# Patient Record
Sex: MALE | Race: WHITE | NOT HISPANIC OR LATINO | Employment: FULL TIME | URBAN - METROPOLITAN AREA
[De-identification: names, ages, dates, MRNs, and addresses within clinical notes are randomized per-mention and may not be internally consistent; named-entity substitution may affect disease eponyms.]

---

## 2019-03-31 ENCOUNTER — HOSPITAL ENCOUNTER (EMERGENCY)
Facility: HOSPITAL | Age: 24
Discharge: HOME | End: 2019-03-31
Attending: EMERGENCY MEDICINE

## 2019-03-31 VITALS
OXYGEN SATURATION: 99 % | DIASTOLIC BLOOD PRESSURE: 82 MMHG | HEART RATE: 92 BPM | SYSTOLIC BLOOD PRESSURE: 132 MMHG | BODY MASS INDEX: 31.06 KG/M2 | WEIGHT: 242 LBS | RESPIRATION RATE: 20 BRPM | TEMPERATURE: 100 F | HEIGHT: 74 IN

## 2019-03-31 DIAGNOSIS — J01.00 ACUTE NON-RECURRENT MAXILLARY SINUSITIS: ICD-10-CM

## 2019-03-31 DIAGNOSIS — J20.9 ACUTE BRONCHITIS, UNSPECIFIED ORGANISM: Primary | ICD-10-CM

## 2019-03-31 PROCEDURE — 99283 EMERGENCY DEPT VISIT LOW MDM: CPT

## 2019-03-31 RX ORDER — AMOXICILLIN 875 MG/1
875 TABLET, FILM COATED ORAL 2 TIMES DAILY
Qty: 14 TABLET | Refills: 0 | Status: SHIPPED | OUTPATIENT
Start: 2019-03-31 | End: 2020-02-05 | Stop reason: CLARIF

## 2019-03-31 RX ORDER — BENZONATATE 100 MG/1
100 CAPSULE ORAL 3 TIMES DAILY PRN
Qty: 20 CAPSULE | Refills: 0 | Status: SHIPPED | OUTPATIENT
Start: 2019-03-31 | End: 2019-04-10

## 2019-03-31 NOTE — ED PROVIDER NOTES
CHIEF COMPLAINT  Chief Complaint   Patient presents with    Cough    Nasal Congestion       HPI  Dejan Coy a 23 y.o. male who presents to the ED with complaints of cough and congestion. Generally does not feel well.   Has taken OTC meds with no improvement in symptoms.  Son has similar symptoms    CURRENT MEDICATIONS  No current facility-administered medications on file prior to encounter.      No current outpatient medications on file prior to encounter.       ALLERGIES  Review of patient's allergies indicates:  No Known Allergies      There is no immunization history on file for this patient.    PAST MEDICAL HISTORY  History reviewed. No pertinent past medical history.    SURGICAL HISTORY  History reviewed. No pertinent surgical history.    SOCIAL HISTORY  Social History     Socioeconomic History    Marital status: Single     Spouse name: Not on file    Number of children: Not on file    Years of education: Not on file    Highest education level: Not on file   Occupational History    Not on file   Social Needs    Financial resource strain: Not on file    Food insecurity:     Worry: Not on file     Inability: Not on file    Transportation needs:     Medical: Not on file     Non-medical: Not on file   Tobacco Use    Smoking status: Current Every Day Smoker   Substance and Sexual Activity    Alcohol use: Yes     Comment: occ    Drug use: Never    Sexual activity: Yes   Lifestyle    Physical activity:     Days per week: Not on file     Minutes per session: Not on file    Stress: Not on file   Relationships    Social connections:     Talks on phone: Not on file     Gets together: Not on file     Attends [employer] service: Not on file     Active member of club or organization: Not on file     Attends meetings of clubs or organizations: Not on file     Relationship status: Not on file    Intimate partner violence:     Fear of current or ex partner: Not on file     Emotionally abused: Not on file  "    Physically abused: Not on file     Forced sexual activity: Not on file   Other Topics Concern    Not on file   Social History Narrative    Not on file       FAMILY HISTORY  History reviewed. No pertinent family history.    REVIEW OF SYSTEMS  Constitutional: + fever,chills, no weakness.  Eyes: No redness, pain, or discharge  HENT: No ear pain, no headache, + rhinorrhea, no throat pain  Respiratory: + cough, wheezing or shortness of breath  Cardiovascular: No chest pain, palpitations or edema  GI: No abdominal pain, nausea, vomiting or diarrhea  Gu: No dysuria, no hematuria, or discharge  Musculoskeletal: No pain, full range of motion. Good sensation  Skin: No rash or abrasion  Neurologic: No focal weakness or sensory changes.  All systems otherwise negative except as noted in the Review of Systems and History of Present Illness      PHYSICAL EXAM  Reviewed Triage Note  VITAL SIGNS:   Patient Vitals for the past 24 hrs:   BP Temp Temp src Pulse Resp SpO2 Height Weight   03/31/19 1744 132/82 99.9 °F (37.7 °C) Oral 92 20 99 % 6' 2" (1.88 m) 109.8 kg (242 lb)     Constitutional: Well developed, well nourished, Alert and oriented x3, No acute distress, non-toxic appearance.  HENT: Normocephalic, Atraumatic, TM's full/dull bilaterally. Pharynx red with purulent PND. + frontal and maxillary tenderness to palpation  Eyes: PERRL, EOMI, Conjunctiva normal, No discharge.  Neck: Normal range of motion, no tenderness, supple  Respiratory: Few scattered rhonchi, no rales, few wheezes  Cardiovascular: Normal heart rate, normal rhythm, no murmurs, no rubs, no gallops.  Gi: Bowel sounds normal, soft, no tenderness, non-distended, no masses, no pulsatile masses.  Musculoskeletal: No edema, no tenderness, no cyanosis, no clubbing. Good range of motion in all major joints. No tenderness to palpation or major deformities noted.   Integument: Warm, Dry, No erythema, no rash  Neurologic: Normal motor function, normal sensory " function. No focal deficits noted. Intact distal pulses  Psychiatric: Affect normal, judgment normal, mood normal      LABS  Pertinent labs reviewed. (see chart for details)  Labs Reviewed - No data to display    RADIOLOGY  No orders to display         PROCEDURE  Procedures      ED COURSE & MEDICAL DECISION MAKING     MDM       Physical exam findings discussed with patient. No acute emergent medical condition identified at this time to warrant further testing. Will dispo home with instructions to follow up with PCP, return to the ED for worsening condition. Pt agrees with plan of care.     DISPOSITION  Patient discharged in stable condition 3/31/2019  6:06 PM      CLINICAL IMPRESSION:  The primary encounter diagnosis was Acute bronchitis, unspecified organism. A diagnosis of Acute non-recurrent maxillary sinusitis was also pertinent to this visit.    Patient advised to follow-up with your PCP within 3 days for BP re-check if Blood Pressure was >120/80 without history of hypertension.         Solange Nunez, MOLLY  03/31/19 4351

## 2020-02-05 ENCOUNTER — HOSPITAL ENCOUNTER (EMERGENCY)
Facility: HOSPITAL | Age: 25
Discharge: HOME | End: 2020-02-05

## 2020-02-05 VITALS
SYSTOLIC BLOOD PRESSURE: 146 MMHG | BODY MASS INDEX: 32.08 KG/M2 | TEMPERATURE: 98 F | HEIGHT: 74 IN | HEART RATE: 100 BPM | RESPIRATION RATE: 20 BRPM | WEIGHT: 250 LBS | OXYGEN SATURATION: 96 % | DIASTOLIC BLOOD PRESSURE: 94 MMHG

## 2020-02-05 DIAGNOSIS — J10.1 INFLUENZA A: Primary | ICD-10-CM

## 2020-02-05 LAB
DEPRECATED S PYO AG THROAT QL EIA: NEGATIVE
INFLUENZA A, MOLECULAR: POSITIVE
INFLUENZA B, MOLECULAR: NEGATIVE
SPECIMEN SOURCE: ABNORMAL

## 2020-02-05 PROCEDURE — 87880 STREP A ASSAY W/OPTIC: CPT

## 2020-02-05 PROCEDURE — 87502 INFLUENZA DNA AMP PROBE: CPT

## 2020-02-05 PROCEDURE — 99284 EMERGENCY DEPT VISIT MOD MDM: CPT

## 2020-02-05 PROCEDURE — 87081 CULTURE SCREEN ONLY: CPT

## 2020-02-05 RX ORDER — FLUTICASONE PROPIONATE 50 MCG
2 SPRAY, SUSPENSION (ML) NASAL DAILY PRN
Qty: 9.9 ML | Refills: 0 | Status: SHIPPED | OUTPATIENT
Start: 2020-02-05 | End: 2020-02-15

## 2020-02-05 RX ORDER — OSELTAMIVIR PHOSPHATE 75 MG/1
75 CAPSULE ORAL 2 TIMES DAILY
Qty: 10 CAPSULE | Refills: 0 | Status: SHIPPED | OUTPATIENT
Start: 2020-02-05 | End: 2020-02-10

## 2020-02-05 NOTE — DISCHARGE INSTRUCTIONS
Take medication as prescribed    Tylenol and/or Motrin for fever and body aches    Keep well hydrated    Return to emergency room if symptoms worsen

## 2020-02-05 NOTE — ED PROVIDER NOTES
Encounter Date: 2/5/2020       History     Chief Complaint   Patient presents with    Cough     Patient complaining of cough, congestion and body aches x3 days.    Nasal Congestion    Generalized Body Aches     Dejan Herrera is a 24 y.o male with no sign PMHx. He presentst to ED with complaint of fever, cough, congestion, body aches and chills x 3 days    No wheezing or respiratory distress    He reports high fever of 103    No abdominal pain. No N/V/D. He is tolerating fluids well    He is current may taking Motrin and Mucinex for symptoms    He denies any close sick contacts            Review of patient's allergies indicates:  No Known Allergies  History reviewed. No pertinent past medical history.  History reviewed. No pertinent surgical history.  History reviewed. No pertinent family history.  Social History     Tobacco Use    Smoking status: Current Every Day Smoker   Substance Use Topics    Alcohol use: Yes     Comment: occ    Drug use: Never     Review of Systems   Constitutional: Positive for chills and fever. Negative for activity change, appetite change, diaphoresis, fatigue and unexpected weight change.   HENT: Positive for congestion, postnasal drip, rhinorrhea, sinus pressure, sinus pain and sore throat. Negative for dental problem, drooling, ear discharge, ear pain, facial swelling, hearing loss, mouth sores, nosebleeds and sneezing.    Eyes: Negative.    Respiratory: Positive for cough. Negative for chest tightness, shortness of breath and wheezing.    Cardiovascular: Negative.  Negative for chest pain.   Gastrointestinal: Negative.  Negative for nausea.   Endocrine: Negative.    Genitourinary: Negative.  Negative for dysuria.   Musculoskeletal: Positive for myalgias. Negative for back pain.   Skin: Negative.  Negative for rash.   Allergic/Immunologic: Negative.    Neurological: Negative.  Negative for weakness.   Hematological: Negative.  Does not bruise/bleed easily.   Psychiatric/Behavioral:  Negative.    All other systems reviewed and are negative.      Physical Exam     Initial Vitals [02/05/20 1118]   BP Pulse Resp Temp SpO2   (!) 146/94 100 20 97.9 °F (36.6 °C) 96 %      MAP       --         Physical Exam    Nursing note and vitals reviewed.  Constitutional: Vital signs are normal. He appears well-developed and well-nourished. He is not diaphoretic. No distress.   HENT:   Head: Normocephalic.   Right Ear: Hearing, tympanic membrane, external ear and ear canal normal.   Left Ear: Hearing, tympanic membrane, external ear and ear canal normal.   Nose: Nose normal.   Mouth/Throat: Uvula is midline, oropharynx is clear and moist and mucous membranes are normal.   Eyes: Conjunctivae are normal.   Neck: Normal range of motion. Neck supple.   Cardiovascular: Normal rate.   Pulmonary/Chest: Breath sounds normal.   Musculoskeletal: Normal range of motion.   Neurological: He is alert and oriented to person, place, and time. GCS score is 15. GCS eye subscore is 4. GCS verbal subscore is 5. GCS motor subscore is 6.   Skin: Skin is warm. Capillary refill takes less than 2 seconds.   Psychiatric: He has a normal mood and affect. His behavior is normal. Judgment and thought content normal.         ED Course   Procedures  Labs Reviewed   INFLUENZA A & B BY MOLECULAR - Abnormal; Notable for the following components:       Result Value    Influenza A, Molecular Positive (*)     All other components within normal limits   THROAT SCREEN, RAPID   CULTURE, STREP A,  THROAT          Imaging Results    None          Medical Decision Making:   Initial Assessment:   Patient with complaint of fever, cough, congestion, body aches and chills x 3 days    No wheezing or respiratory distress    He reports high fever of 103    No abdominal pain. No N/V/D. He is tolerating fluids well    He is current may taking Motrin and Mucinex for symptoms    He denies any close sick contacts        Differential Diagnosis:   Influenza, strep,  sinusitis, pneumonia, bronchitis, URI  ED Management:  Strep negative    Influenza positive    Discussed physical exam findings with patient  No acute emergent medical condition identified at this time to warrant further testing/diagnostics  At this time, I believe the patient is clinically stable for discharge.   Patient to follow up with PCP in 1-2 days.  The patient acknowledges that close follow up with a MD is required after all ER visits  Pt given instructions; take all medications prescribed in the ER as directed.   Patient agrees to comply with all instruction and direction given in the ER  Pt agrees to return to ER if any symptoms reoccur                                          Clinical Impression:       ICD-10-CM ICD-9-CM   1. Influenza A J10.1 487.1                             Anabel Dorantes NP  02/05/20 1215

## 2020-02-05 NOTE — ED NOTES
Pt d/c and medication instructions reviewed and he verbalized understanding. Pt ambulatory at d/c and escorted to registration.

## 2020-02-07 LAB — BACTERIA THROAT CULT: NORMAL

## 2020-05-19 ENCOUNTER — HOSPITAL ENCOUNTER (EMERGENCY)
Facility: HOSPITAL | Age: 25
Discharge: HOME | End: 2020-05-19
Attending: EMERGENCY MEDICINE

## 2020-05-19 VITALS
WEIGHT: 256 LBS | HEART RATE: 84 BPM | RESPIRATION RATE: 20 BRPM | OXYGEN SATURATION: 97 % | DIASTOLIC BLOOD PRESSURE: 94 MMHG | BODY MASS INDEX: 32.85 KG/M2 | HEIGHT: 74 IN | TEMPERATURE: 98 F | SYSTOLIC BLOOD PRESSURE: 144 MMHG

## 2020-05-19 DIAGNOSIS — S05.02XA ABRASION OF LEFT CORNEA, INITIAL ENCOUNTER: Primary | ICD-10-CM

## 2020-05-19 PROCEDURE — 99283 EMERGENCY DEPT VISIT LOW MDM: CPT

## 2020-05-19 PROCEDURE — 25000003 PHARM REV CODE 250: Performed by: EMERGENCY MEDICINE

## 2020-05-19 RX ORDER — TETRACAINE HYDROCHLORIDE 5 MG/ML
2 SOLUTION OPHTHALMIC ONCE
Status: COMPLETED | OUTPATIENT
Start: 2020-05-19 | End: 2020-05-19

## 2020-05-19 RX ADMIN — FLUORESCEIN SODIUM 1 EACH: 1 STRIP OPHTHALMIC at 11:05

## 2020-05-19 RX ADMIN — TETRACAINE HYDROCHLORIDE 2 DROP: 5 SOLUTION OPHTHALMIC at 11:05

## 2020-05-19 NOTE — ED PROVIDER NOTES
Encounter Date: 5/19/2020       History     Chief Complaint   Patient presents with    Eye Problem     Patient has burning and redness to left eye.     24-year-old male with no significant past medical history presents to the ED for evaluation of left eye irritation with increased tearing and redness.  States he was in [employer] yesterday, felt as though something was stuck in his eye, irrigated aggressively with some improvement, went to bed with no issue, but awoke with the same irritation.  Denies decreased vision or eye pain.  Denies fever, chills.        Review of patient's allergies indicates:  No Known Allergies  History reviewed. No pertinent past medical history.  History reviewed. No pertinent surgical history.  History reviewed. No pertinent family history.  Social History     Tobacco Use    Smoking status: Current Every Day Smoker   Substance Use Topics    Alcohol use: Yes     Comment: occ    Drug use: Never     Review of Systems   Constitutional: Negative for appetite change, chills, diaphoresis, fatigue and fever.   HENT: Negative for congestion, ear pain, rhinorrhea, sinus pressure, sinus pain, sore throat and tinnitus.    Eyes: Positive for discharge, redness and itching. Negative for photophobia, pain and visual disturbance.   Respiratory: Negative for cough, chest tightness, shortness of breath and wheezing.    Cardiovascular: Negative for chest pain, palpitations and leg swelling.   Gastrointestinal: Negative for abdominal pain, constipation, diarrhea, nausea and vomiting.   Endocrine: Negative for cold intolerance, heat intolerance, polydipsia, polyphagia and polyuria.   Genitourinary: Negative for decreased urine volume, difficulty urinating, dysuria, flank pain, frequency, hematuria and urgency.   Musculoskeletal: Negative for arthralgias, back pain, gait problem, joint swelling, myalgias, neck pain and neck stiffness.   Skin: Negative for color change, pallor, rash and wound.    Allergic/Immunologic: Negative for immunocompromised state.   Neurological: Negative for dizziness, syncope, weakness, light-headedness, numbness and headaches.   Hematological: Negative for adenopathy. Does not bruise/bleed easily.   Psychiatric/Behavioral: Negative for decreased concentration, dysphoric mood and sleep disturbance. The patient is not nervous/anxious.    All other systems reviewed and are negative.      Physical Exam     Initial Vitals [05/19/20 1054]   BP Pulse Resp Temp SpO2   (!) 144/94 84 20 98.3 °F (36.8 °C) 97 %      MAP       --         Physical Exam    Nursing note and vitals reviewed.  Constitutional: He appears well-developed and well-nourished. He is not diaphoretic. No distress.   HENT:   Head: Normocephalic and atraumatic.   Right Ear: External ear normal.   Left Ear: External ear normal.   Nose: Nose normal.   Mouth/Throat: Oropharynx is clear and moist.   Eyes: EOM and lids are normal. Pupils are equal, round, and reactive to light. Lids are everted and swept, no foreign bodies found. Right eye exhibits no discharge and no exudate. No foreign body present in the right eye. Left eye exhibits discharge. Left eye exhibits no exudate. No foreign body present in the left eye. Right conjunctiva is not injected. Right conjunctiva has no hemorrhage. Left conjunctiva is injected. Left conjunctiva has no hemorrhage. No scleral icterus.   Neck: Normal range of motion. Neck supple.   Cardiovascular: Normal rate, regular rhythm, normal heart sounds and intact distal pulses.   Pulmonary/Chest: Breath sounds normal. No respiratory distress. He has no wheezes. He has no rhonchi. He exhibits no tenderness.   Abdominal: Soft. Bowel sounds are normal. He exhibits no distension. There is no tenderness. There is no rebound and no guarding.   Musculoskeletal: Normal range of motion. He exhibits no edema or tenderness.   Lymphadenopathy:     He has no cervical adenopathy.   Neurological: He is alert and  oriented to person, place, and time. GCS score is 15. GCS eye subscore is 4. GCS verbal subscore is 5. GCS motor subscore is 6.   Skin: Skin is warm and dry. Capillary refill takes less than 2 seconds. No rash and no abscess noted. No erythema. No pallor.   Psychiatric: He has a normal mood and affect. His behavior is normal. Judgment and thought content normal.         ED Course   Procedures  Labs Reviewed - No data to display       Imaging Results    None          Medical Decision Making:   Differential Diagnosis:   Retained foreign object, conjunctivitis, corneal abrasion  ED Management:  Tetracaine applied and fluorescein strip utilized with Woods lamp - no visualization of corneal ulceration                                 Clinical Impression:       ICD-10-CM ICD-9-CM   1. Abrasion of left cornea, initial encounter S05.02XA 918.1         Disposition:   Disposition: Discharged  Condition: Stable     ED Disposition Condition    Discharge Stable        ED Prescriptions     Medication Sig Dispense Start Date End Date Auth. Provider    gentamicin 15 mg Drop Place 1 drop into the left eye 2 (two) times daily. for 5 days 15 mL 5/19/2020 5/24/2020 Susanna Lozoya MD        Follow-up Information    None                                    Susanna Lozoya MD  05/19/20 6162

## 2020-06-19 ENCOUNTER — HOSPITAL ENCOUNTER (EMERGENCY)
Facility: HOSPITAL | Age: 25
Discharge: HOME | End: 2020-06-19
Attending: FAMILY MEDICINE
Payer: OTHER GOVERNMENT

## 2020-06-19 VITALS
RESPIRATION RATE: 20 BRPM | HEIGHT: 74 IN | DIASTOLIC BLOOD PRESSURE: 84 MMHG | TEMPERATURE: 99 F | HEART RATE: 91 BPM | SYSTOLIC BLOOD PRESSURE: 130 MMHG | BODY MASS INDEX: 33.11 KG/M2 | WEIGHT: 258 LBS | OXYGEN SATURATION: 96 %

## 2020-06-19 DIAGNOSIS — B34.9 VIRAL SYNDROME: ICD-10-CM

## 2020-06-19 DIAGNOSIS — R42 DIZZINESS: Primary | ICD-10-CM

## 2020-06-19 LAB — SARS-COV-2 RDRP RESP QL NAA+PROBE: NEGATIVE

## 2020-06-19 PROCEDURE — 25000003 PHARM REV CODE 250: Performed by: FAMILY MEDICINE

## 2020-06-19 PROCEDURE — U0002 COVID-19 LAB TEST NON-CDC: HCPCS

## 2020-06-19 PROCEDURE — 99283 EMERGENCY DEPT VISIT LOW MDM: CPT

## 2020-06-19 RX ORDER — ACETAMINOPHEN 500 MG
1000 TABLET ORAL
Status: COMPLETED | OUTPATIENT
Start: 2020-06-19 | End: 2020-06-19

## 2020-06-19 RX ADMIN — ACETAMINOPHEN 1000 MG: 500 TABLET, FILM COATED ORAL at 10:06

## 2020-06-19 NOTE — Clinical Note
Dejan Herrera was seen and treated in our emergency department on 6/19/2020.  He may return to work on 06/23/2020.       If you have any questions or concerns, please don't hesitate to call.      Mikal Guy MD

## 2020-06-20 NOTE — ED PROVIDER NOTES
Encounter Date: 6/19/2020       History     Chief Complaint   Patient presents with    Dizziness     Patient complaining of lightheadedness, vertigo, headache and feeling like he wants to pass out.    Headache     24-year-old male presents complaining of sensation of feeling lightheaded fatigue with mild headache in the nuchal area he denies any cough nausea vomiting shortness of breath I was eating at a restaurant and does felt ill he denies diarrhea no history of fever or known exposure to COVID-19        Review of patient's allergies indicates:  No Known Allergies  History reviewed. No pertinent past medical history.  History reviewed. No pertinent surgical history.  History reviewed. No pertinent family history.  Social History     Tobacco Use    Smoking status: Current Every Day Smoker   Substance Use Topics    Alcohol use: Yes     Comment: occ    Drug use: Never     Review of Systems   Constitutional: Positive for fatigue. Negative for fever.   HENT: Negative for sore throat.    Respiratory: Negative for shortness of breath.    Cardiovascular: Negative for chest pain.   Gastrointestinal: Negative for nausea.   Genitourinary: Negative for dysuria.   Musculoskeletal: Negative for back pain.   Skin: Negative for rash.   Neurological: Positive for dizziness, weakness and headaches.   Hematological: Does not bruise/bleed easily.       Physical Exam     Initial Vitals [06/19/20 2112]   BP Pulse Resp Temp SpO2   (!) 141/88 99 20 98.6 °F (37 °C) 97 %      MAP       --         Physical Exam    Nursing note and vitals reviewed.  Constitutional: He appears well-developed and well-nourished. He is not diaphoretic. No distress.   HENT:   Head: Normocephalic and atraumatic.   Right Ear: External ear normal.   Left Ear: External ear normal.   Nose: Nose normal.   Mouth/Throat: Oropharynx is clear and moist. No oropharyngeal exudate.   Eyes: EOM are normal.   Neck: Normal range of motion. Neck supple. No tracheal  deviation present.   Cardiovascular: Normal rate and regular rhythm.   No murmur heard.  Pulmonary/Chest: Breath sounds normal. No stridor. No respiratory distress. He has no rales.   Abdominal: Soft. He exhibits no distension and no mass. There is no abdominal tenderness. There is no rebound.   Musculoskeletal: Normal range of motion. No edema.   Lymphadenopathy:     He has no cervical adenopathy.   Neurological: He is alert and oriented to person, place, and time. He has normal strength.   Skin: Skin is warm and dry. Capillary refill takes less than 2 seconds. No pallor.   Psychiatric: He has a normal mood and affect.         ED Course   Procedures  Labs Reviewed   SARS-COV-2 RNA AMPLIFICATION, QUAL          Imaging Results    None                                          Clinical Impression:       ICD-10-CM ICD-9-CM   1. Dizziness  R42 780.4   2. Viral syndrome  B34.9 079.99             ED Disposition Condition    Discharge Stable        ED Prescriptions     None        Follow-up Information    None                                    Mikal Guy MD  06/20/20 0255

## 2020-06-24 ENCOUNTER — LAB VISIT (OUTPATIENT)
Dept: LAB | Facility: HOSPITAL | Age: 25
End: 2020-06-24
Attending: FAMILY MEDICINE
Payer: OTHER GOVERNMENT

## 2020-06-24 ENCOUNTER — HOSPITAL ENCOUNTER (OUTPATIENT)
Dept: RADIOLOGY | Facility: HOSPITAL | Age: 25
Discharge: HOME | End: 2020-06-24
Attending: FAMILY MEDICINE

## 2020-06-24 ENCOUNTER — OFFICE VISIT (OUTPATIENT)
Dept: FAMILY MEDICINE | Facility: CLINIC | Age: 25
End: 2020-06-24

## 2020-06-24 VITALS
TEMPERATURE: 97 F | BODY MASS INDEX: 31.97 KG/M2 | HEIGHT: 74 IN | DIASTOLIC BLOOD PRESSURE: 91 MMHG | SYSTOLIC BLOOD PRESSURE: 139 MMHG | WEIGHT: 249.13 LBS | HEART RATE: 86 BPM | RESPIRATION RATE: 17 BRPM | OXYGEN SATURATION: 97 %

## 2020-06-24 DIAGNOSIS — K59.00 CONSTIPATION, UNSPECIFIED CONSTIPATION TYPE: ICD-10-CM

## 2020-06-24 DIAGNOSIS — R42 DIZZINESS: ICD-10-CM

## 2020-06-24 DIAGNOSIS — R42 DIZZINESS: Primary | ICD-10-CM

## 2020-06-24 LAB
ALBUMIN SERPL BCP-MCNC: 4.8 G/DL (ref 3.5–5.2)
ALP SERPL-CCNC: 67 U/L (ref 55–135)
ALT SERPL W/O P-5'-P-CCNC: 67 U/L (ref 10–44)
ANION GAP SERPL CALC-SCNC: 11 MMOL/L (ref 8–16)
AST SERPL-CCNC: 31 U/L (ref 10–40)
BASOPHILS # BLD AUTO: 0.07 K/UL (ref 0–0.2)
BASOPHILS NFR BLD: 0.4 % (ref 0–1.9)
BILIRUB SERPL-MCNC: 0.9 MG/DL (ref 0.1–1)
BUN SERPL-MCNC: 14 MG/DL (ref 6–20)
CALCIUM SERPL-MCNC: 9.5 MG/DL (ref 8.7–10.5)
CHLORIDE SERPL-SCNC: 101 MMOL/L (ref 95–110)
CO2 SERPL-SCNC: 23 MMOL/L (ref 23–29)
CREAT SERPL-MCNC: 1.1 MG/DL (ref 0.5–1.4)
DIFFERENTIAL METHOD: ABNORMAL
EOSINOPHIL # BLD AUTO: 0.2 K/UL (ref 0–0.5)
EOSINOPHIL NFR BLD: 1.1 % (ref 0–8)
ERYTHROCYTE [DISTWIDTH] IN BLOOD BY AUTOMATED COUNT: 11.9 % (ref 11.5–14.5)
EST. GFR  (AFRICAN AMERICAN): >60 ML/MIN/1.73 M^2
EST. GFR  (NON AFRICAN AMERICAN): >60 ML/MIN/1.73 M^2
GLUCOSE SERPL-MCNC: 88 MG/DL (ref 70–110)
HCT VFR BLD AUTO: 49 % (ref 40–54)
HGB BLD-MCNC: 17.3 G/DL (ref 14–18)
IMM GRANULOCYTES # BLD AUTO: 0.06 K/UL (ref 0–0.04)
IMM GRANULOCYTES NFR BLD AUTO: 0.4 % (ref 0–0.5)
LYMPHOCYTES # BLD AUTO: 3.5 K/UL (ref 1–4.8)
LYMPHOCYTES NFR BLD: 22 % (ref 18–48)
MCH RBC QN AUTO: 30.7 PG (ref 27–31)
MCHC RBC AUTO-ENTMCNC: 35.3 G/DL (ref 32–36)
MCV RBC AUTO: 87 FL (ref 82–98)
MONOCYTES # BLD AUTO: 0.8 K/UL (ref 0.3–1)
MONOCYTES NFR BLD: 4.9 % (ref 4–15)
NEUTROPHILS # BLD AUTO: 11.4 K/UL (ref 1.8–7.7)
NEUTROPHILS NFR BLD: 71.2 % (ref 38–73)
NRBC BLD-RTO: 0 /100 WBC
PLATELET # BLD AUTO: 325 K/UL (ref 150–350)
PMV BLD AUTO: 10.7 FL (ref 9.2–12.9)
POTASSIUM SERPL-SCNC: 3.8 MMOL/L (ref 3.5–5.1)
PROT SERPL-MCNC: 8.2 G/DL (ref 6–8.4)
RBC # BLD AUTO: 5.63 M/UL (ref 4.6–6.2)
SODIUM SERPL-SCNC: 135 MMOL/L (ref 136–145)
TSH SERPL DL<=0.005 MIU/L-ACNC: 2.55 UIU/ML (ref 0.34–5.6)
WBC # BLD AUTO: 16.02 K/UL (ref 3.9–12.7)

## 2020-06-24 PROCEDURE — 36415 COLL VENOUS BLD VENIPUNCTURE: CPT

## 2020-06-24 PROCEDURE — 99203 PR OFFICE/OUTPT VISIT, NEW, LEVL III, 30-44 MIN: ICD-10-PCS | Mod: S$GLB,,, | Performed by: FAMILY MEDICINE

## 2020-06-24 PROCEDURE — 99203 OFFICE O/P NEW LOW 30 MIN: CPT | Mod: S$GLB,,, | Performed by: FAMILY MEDICINE

## 2020-06-24 PROCEDURE — 74019 XR ABDOMEN FLAT AND ERECT: ICD-10-PCS | Mod: 26,,, | Performed by: RADIOLOGY

## 2020-06-24 PROCEDURE — 84443 ASSAY THYROID STIM HORMONE: CPT

## 2020-06-24 PROCEDURE — 85025 COMPLETE CBC W/AUTO DIFF WBC: CPT

## 2020-06-24 PROCEDURE — 74019 RADEX ABDOMEN 2 VIEWS: CPT | Mod: 26,,, | Performed by: RADIOLOGY

## 2020-06-24 PROCEDURE — 80053 COMPREHEN METABOLIC PANEL: CPT

## 2020-06-24 PROCEDURE — 74019 RADEX ABDOMEN 2 VIEWS: CPT | Mod: TC,FY

## 2020-06-24 RX ORDER — ACETAMINOPHEN 500 MG
500 TABLET ORAL EVERY 6 HOURS PRN
COMMUNITY
End: 2020-07-22

## 2020-06-25 ENCOUNTER — TELEPHONE (OUTPATIENT)
Dept: FAMILY MEDICINE | Facility: CLINIC | Age: 25
End: 2020-06-25

## 2020-06-25 NOTE — TELEPHONE ENCOUNTER
----- Message from Kathy Rock sent at 6/25/2020  9:52 AM CDT -----  Regarding: lab results  Contact: self  Type:  Test Results    Who Called: self  Name of Test (Lab/Mammo/Etc):  lab and x-ray  Date of Test:  06/24/2020  Ordering Provider:  Dr Campos   Where the test was performed:    Best Call Back Number:  [phone]  Additional Information:

## 2020-06-25 NOTE — TELEPHONE ENCOUNTER
Spoke to patient about his lab results. Informed that WBC elevated but no left shift. No signs or symptoms of infection seen at appt. Will repeat cbc in 3 weeks.   Thyroid and CMP normal.   KUB normal.  Patient reports after he took mag citrate he felt better.   Will follow up next week at appt.

## 2020-07-01 ENCOUNTER — OFFICE VISIT (OUTPATIENT)
Dept: FAMILY MEDICINE | Facility: CLINIC | Age: 25
End: 2020-07-01

## 2020-07-01 VITALS
SYSTOLIC BLOOD PRESSURE: 139 MMHG | WEIGHT: 244.63 LBS | TEMPERATURE: 98 F | HEART RATE: 95 BPM | OXYGEN SATURATION: 96 % | BODY MASS INDEX: 31.39 KG/M2 | RESPIRATION RATE: 16 BRPM | HEIGHT: 74 IN | DIASTOLIC BLOOD PRESSURE: 92 MMHG

## 2020-07-01 DIAGNOSIS — D72.829 LEUKOCYTOSIS, UNSPECIFIED TYPE: ICD-10-CM

## 2020-07-01 DIAGNOSIS — R14.0 ABDOMINAL BLOATING: Primary | ICD-10-CM

## 2020-07-01 PROCEDURE — 99214 OFFICE O/P EST MOD 30 MIN: CPT | Mod: S$GLB,,, | Performed by: FAMILY MEDICINE

## 2020-07-01 PROCEDURE — 99214 PR OFFICE/OUTPT VISIT, EST, LEVL IV, 30-39 MIN: ICD-10-PCS | Mod: S$GLB,,, | Performed by: FAMILY MEDICINE

## 2020-07-01 NOTE — PATIENT INSTRUCTIONS
Eating a High-Fiber Diet  Fiber is what gives strength and structure to plants. Most grains, beans, vegetables, and fruits contain fiber. Foods rich in fiber are often low in calories and fat, and they fill you up more. They may also reduce your risks for certain health problems. To find out the amount of fiber in canned, packaged, or frozen foods, read the Nutrition Facts label. It tells you how much fiber is in a serving.    Types of fiber and their benefits  There are two types of fiber: insoluble and soluble. They both aid digestion and help you maintain a healthy weight.  · Insoluble fiber. This is found in whole grains, cereals, certain fruits and vegetables such as apple skin, corn, and carrots. Insoluble fiber may prevent constipation and reduce the risk for certain types of cancer.  · Soluble fiber. This type of fiber is in oats, beans, and certain fruits and vegetables such as strawberries and peas. Soluble fiber can reduce cholesterol, which may help lower the risk for heart disease. It also helps control blood sugar levels.  Look for high-fiber foods  Try these foods to add fiber to your diet:  · Whole-grain breads and cereals. Try to eat 6 to 8 ounces a day. Include wheat and oat bran cereals, whole-wheat muffins or toast, and corn tortillas in your meals.  · Fruits. Try to eat 2 cups a day. Apples, oranges, strawberries, pears, and bananas are good sources. (Note: Fruit juice is low in fiber.)  · Vegetables. Try to eat at least 2.5 cups a day. Add asparagus, carrots, broccoli, peas, and corn to your meals.  · Beans. One cup of cooked lentils gives you over 15 grams of fiber. Try navy beans, lentils, and chickpeas.  · Seeds. A small handful of seeds gives you about 3 grams of fiber. Try sunflower seeds.  Keep track of your fiber  Keep track of how much fiber you eat. Start by reading food labels. Then eat a variety of foods high in fiber. As you begin to eat more fiber, ask your healthcare provider  how much water you should be drinking to keep your digestive system working smoothly.  You should aim for a certain amount of fiber in your diet each day. If you are a woman, that amount is between 25 and 28 grams per day. Men should aim for 30 to 33 grams per day. After age 50, your daily fiber needs drop to 22 grams for women and 28 grams for men.  Before you reach for the fiber supplements, think about this. Fiber is found naturally in healthy whole foods. It gives you that feeling of fullness after you eat. Taking fiber supplements or eating fiber-enriched foods will not give you this full feeling.  Your fiber intake is a good measure for the quality of your overall diet. If you are missing out on your daily amount of fiber, you may be lacking other important nutrients as well.  Date Last Reviewed: 5/11/2015 © 2000-2017 [employer]. [address] [place] [address]. All rights reserved. This information is not intended as a substitute for professional medical care. Always follow your healthcare professional's instructions.

## 2020-07-15 ENCOUNTER — LAB VISIT (OUTPATIENT)
Dept: LAB | Facility: HOSPITAL | Age: 25
End: 2020-07-15
Attending: FAMILY MEDICINE
Payer: OTHER GOVERNMENT

## 2020-07-15 DIAGNOSIS — D72.829 LEUKOCYTOSIS, UNSPECIFIED TYPE: ICD-10-CM

## 2020-07-15 LAB
BASOPHILS # BLD AUTO: 0.03 K/UL (ref 0–0.2)
BASOPHILS NFR BLD: 0.3 % (ref 0–1.9)
DIFFERENTIAL METHOD: ABNORMAL
EOSINOPHIL # BLD AUTO: 0.3 K/UL (ref 0–0.5)
EOSINOPHIL NFR BLD: 2.2 % (ref 0–8)
ERYTHROCYTE [DISTWIDTH] IN BLOOD BY AUTOMATED COUNT: 12 % (ref 11.5–14.5)
HCT VFR BLD AUTO: 46.7 % (ref 40–54)
HGB BLD-MCNC: 16.2 G/DL (ref 14–18)
IMM GRANULOCYTES # BLD AUTO: 0.06 K/UL (ref 0–0.04)
IMM GRANULOCYTES NFR BLD AUTO: 0.5 % (ref 0–0.5)
LYMPHOCYTES # BLD AUTO: 2.8 K/UL (ref 1–4.8)
LYMPHOCYTES NFR BLD: 24.2 % (ref 18–48)
MCH RBC QN AUTO: 30.8 PG (ref 27–31)
MCHC RBC AUTO-ENTMCNC: 34.7 G/DL (ref 32–36)
MCV RBC AUTO: 89 FL (ref 82–98)
MONOCYTES # BLD AUTO: 0.5 K/UL (ref 0.3–1)
MONOCYTES NFR BLD: 4.7 % (ref 4–15)
NEUTROPHILS # BLD AUTO: 7.8 K/UL (ref 1.8–7.7)
NEUTROPHILS NFR BLD: 68.1 % (ref 38–73)
NRBC BLD-RTO: 0 /100 WBC
PLATELET # BLD AUTO: 261 K/UL (ref 150–350)
PMV BLD AUTO: 11.2 FL (ref 9.2–12.9)
RBC # BLD AUTO: 5.26 M/UL (ref 4.6–6.2)
WBC # BLD AUTO: 11.38 K/UL (ref 3.9–12.7)

## 2020-07-15 PROCEDURE — 36415 COLL VENOUS BLD VENIPUNCTURE: CPT

## 2020-07-15 PROCEDURE — 85025 COMPLETE CBC W/AUTO DIFF WBC: CPT

## 2020-07-16 ENCOUNTER — PATIENT OUTREACH (OUTPATIENT)
Dept: ADMINISTRATIVE | Facility: HOSPITAL | Age: 25
End: 2020-07-16

## 2020-07-22 ENCOUNTER — OFFICE VISIT (OUTPATIENT)
Dept: FAMILY MEDICINE | Facility: CLINIC | Age: 25
End: 2020-07-22

## 2020-07-22 ENCOUNTER — HOSPITAL ENCOUNTER (OUTPATIENT)
Dept: CARDIOLOGY | Facility: HOSPITAL | Age: 25
Discharge: HOME | End: 2020-07-22
Attending: FAMILY MEDICINE
Payer: OTHER GOVERNMENT

## 2020-07-22 VITALS
OXYGEN SATURATION: 98 % | SYSTOLIC BLOOD PRESSURE: 126 MMHG | DIASTOLIC BLOOD PRESSURE: 88 MMHG | HEART RATE: 90 BPM | RESPIRATION RATE: 17 BRPM | TEMPERATURE: 99 F | BODY MASS INDEX: 30.08 KG/M2 | WEIGHT: 234.38 LBS | HEIGHT: 74 IN

## 2020-07-22 DIAGNOSIS — R07.9 CHEST PAIN, UNSPECIFIED TYPE: Primary | ICD-10-CM

## 2020-07-22 DIAGNOSIS — R07.9 CHEST PAIN, UNSPECIFIED TYPE: ICD-10-CM

## 2020-07-22 DIAGNOSIS — R53.83 FATIGUE, UNSPECIFIED TYPE: ICD-10-CM

## 2020-07-22 PROCEDURE — 93005 ELECTROCARDIOGRAM TRACING: CPT

## 2020-07-22 PROCEDURE — 99214 PR OFFICE/OUTPT VISIT, EST, LEVL IV, 30-39 MIN: ICD-10-PCS | Mod: S$GLB,,, | Performed by: FAMILY MEDICINE

## 2020-07-22 PROCEDURE — 99214 OFFICE O/P EST MOD 30 MIN: CPT | Mod: S$GLB,,, | Performed by: FAMILY MEDICINE

## 2020-07-22 NOTE — PROGRESS NOTES
"[facility] [place] [facility]    Subjective      Mr. Herrera is a 24 y.o. male who presents to clinic for a follow up.     Reports that his stomach pain has resolved.   States that he does not feel like himself.   Feels like something is wrong.   Admits to fatigue and intermittent substernal chest pain.   Repeat CBC normal and resolution of leukocytosis.    PMH Dejan has a past medical history of No pertinent past medical history (06/24/2020).   PSXH Dejan has a past surgical history that includes no surgical history  (06/24/2020).    Dejan's family history includes COPD in his mother; Crohn's disease in his mother; Emphysema in his mother; Epilepsy in his mother; No Known Problems in his father.   SH Dejan reports that he has been smoking. He has a 10.50 pack-year smoking history. He has never used smokeless tobacco. He reports current alcohol use. He reports that he does not use drugs.   ALG Dejan has No Known Allergies.   MED Dejan currently has no medications in their medication list.     Review of Systems   Constitutional: Positive for fatigue. Negative for activity change, appetite change, chills and fever.   Eyes: Negative for visual disturbance.   Respiratory: Negative for cough and shortness of breath.    Cardiovascular: Positive for chest pain. Negative for palpitations and leg swelling.   Gastrointestinal: Negative for abdominal pain, nausea and vomiting.   Skin: Negative for wound.   Neurological: Negative for dizziness, syncope and headaches.   Psychiatric/Behavioral: Negative for confusion.     Objective     /88   Pulse 90   Temp 99.1 °F (37.3 °C) (Temporal)   Resp 17   Ht 6' 2" (1.88 m)   Wt 106.3 kg (234 lb 6 oz)   SpO2 98%   BMI 30.09 kg/m²     Physical Exam   Constitutional:  Non-toxic appearance. He does not appear ill. No distress.   HENT:   Head: Normocephalic and atraumatic.   Eyes: Right eye exhibits no discharge. Left eye exhibits no discharge.   Cardiovascular: " Normal rate, regular rhythm, normal heart sounds and normal pulses. Exam reveals no gallop and no friction rub.   No murmur heard.  Pulmonary/Chest: Effort normal and breath sounds normal. No respiratory distress. He has no wheezes. He has no rhonchi. He has no rales. He exhibits no tenderness.   Abdominal: Normal appearance.   Musculoskeletal:      Right lower leg: No edema.      Left lower leg: No edema.   Lymphadenopathy:     He has no cervical adenopathy.   Neurological: He is alert.   Skin: Skin is warm and dry. Capillary refill takes less than 2 seconds. He is not diaphoretic.   Psychiatric: His behavior is normal. Mood, judgment and thought content normal.   Vitals reviewed.     Assessment/Plan     Dejan was seen today for fatigue.    Diagnoses and all orders for this visit:    Chest pain, unspecified type  -     SCHEDULED EKG 12-LEAD (to Muse); Future    Fatigue, unspecified type  -     Vitamin D; Future  -     Vitamin B12; Future      Ella Campos MD  Family Medicine  [facility]

## 2020-08-09 NOTE — PROGRESS NOTES
"[facility] - Clinic Note    Subjective      Mr. Herrera is a 24 y.o. male who presents to clinic for a follow up.     Patient was seen last week for episodes of dizziness.   Labs were obtained which revealed that ALT mildly elevated and WBC at 16.02, and TSH normal.  Reports that dizziness has improved.   Complains of abdominal bloating.   Present for the past week. Was seen last week for constipation. Took mag citrate which helped. States that he has bloating and feels full.   Denies diarrhea or abdominal pain. No nausea or vomiting.    PMLIZZIE Wylie has a past medical history of No pertinent past medical history (06/24/2020).   PSXH Dejan has a past surgical history that includes no surgical history  (06/24/2020).   PAWEL Wylie's family history includes COPD in his mother; Crohn's disease in his mother; Emphysema in his mother; Epilepsy in his mother; No Known Problems in his father.   RADHA Wylie reports that he has been smoking. He has a 10.50 pack-year smoking history. He has never used smokeless tobacco. He reports current alcohol use. He reports that he does not use drugs.   AISSATOU Wylie has No Known Allergies.   CHIKI Wylie currently has no medications in their medication list.     Review of Systems   Constitutional: Negative for activity change, appetite change, chills, fatigue and fever.   Eyes: Negative for visual disturbance.   Respiratory: Negative for cough and shortness of breath.    Cardiovascular: Negative for chest pain, palpitations and leg swelling.   Gastrointestinal: Negative for abdominal pain, nausea and vomiting.   Skin: Negative for wound.   Neurological: Negative for dizziness, syncope and headaches.   Psychiatric/Behavioral: Negative for confusion.     Objective     BP (!) 139/92 (BP Location: Right arm, Patient Position: Sitting, BP Method: Large (Automatic))   Pulse 95   Temp 98 °F (36.7 °C) (Temporal)   Resp 16   Ht 6' 2" (1.88 m)   Wt 110.9 kg (244 lb 9.6 oz)   SpO2 96%   BMI " 31.40 kg/m²     Physical Exam   Constitutional:  Non-toxic appearance. He does not appear ill. No distress.   HENT:   Head: Normocephalic and atraumatic.   Eyes: Right eye exhibits no discharge. Left eye exhibits no discharge.   Cardiovascular: Normal rate, regular rhythm, normal heart sounds and normal pulses. Exam reveals no gallop and no friction rub.   No murmur heard.  Pulmonary/Chest: Effort normal and breath sounds normal. No respiratory distress. He has no wheezes. He has no rhonchi. He has no rales.   Abdominal: Normal appearance.   Musculoskeletal:      Right lower leg: No edema.      Left lower leg: No edema.   Lymphadenopathy:     He has no cervical adenopathy.   Neurological: He is alert.   Skin: Skin is warm and dry. Capillary refill takes less than 2 seconds. He is not diaphoretic.   Psychiatric: His behavior is normal. Mood, judgment and thought content normal.   Vitals reviewed.     Assessment/Plan     Dejan was seen today for follow-up.    Diagnoses and all orders for this visit:    Abdominal bloating  -OTC gas-x and low fat, high fiber diet.    Leukocytosis, unspecified type  -     CBC auto differential; Future  - Recheck in 3-4 weeks.        Ella Campos MD  Family Medicine  [facility]

## 2020-08-26 NOTE — PROGRESS NOTES
"[facility] - Clinic Note    Subjective      Mr. Herrera is a 24 y.o. male who presents to clinic with complaints of dizziness.     Patient reports that he went to eat a [place] a couple days ago and felt dizzy, middle chest pain, and abdominal fullness.   Denies the sensation that the room is spinning.   Stomach feels full. No able to make a BM.  States that he went to [facility] the day after his symptom onset. Labs were obtained. States that he was told his white count was high and his ear looked red.     PMLIZZIE Wylie has a past medical history of No pertinent past medical history (06/24/2020).   PSXH Dejan has a past surgical history that includes no surgical history  (06/24/2020).   PAWEL Wylie's family history includes COPD in his mother; Crohn's disease in his mother; Emphysema in his mother; Epilepsy in his mother; No Known Problems in his father.   RADHA Wylie reports that he has been smoking. He has a 10.50 pack-year smoking history. He has never used smokeless tobacco. He reports current alcohol use. He reports that he does not use drugs.   AISSATOU Wylie has No Known Allergies.   CHIKI Wylie currently has no medications in their medication list.     Review of Systems   Constitutional: Negative for activity change, appetite change, chills, fatigue and fever.   Eyes: Negative for visual disturbance.   Respiratory: Negative for cough and shortness of breath.    Cardiovascular: Negative for chest pain, palpitations and leg swelling.   Gastrointestinal: Positive for constipation. Negative for abdominal pain, nausea and vomiting.   Skin: Negative for wound.   Neurological: Positive for dizziness. Negative for syncope and headaches.   Psychiatric/Behavioral: Negative for confusion.     Objective     BP (!) 139/91 (BP Location: Right arm)   Pulse 86   Temp 97 °F (36.1 °C) (Temporal)   Resp 17   Ht 6' 2" (1.88 m)   Wt 113 kg (249 lb 2 oz)   SpO2 97%   BMI 31.99 kg/m²     Physical Exam   Constitutional:  " Non-toxic appearance. He does not appear ill. No distress.   HENT:   Head: Normocephalic and atraumatic.   Right Ear: Tympanic membrane, external ear and ear canal normal.   Left Ear: Tympanic membrane, external ear and ear canal normal.   Nose: Nose normal.   Mouth/Throat: Mucous membranes are moist. Oropharynx is clear.   Eyes: Pupils are equal, round, and reactive to light. Conjunctivae are normal. Right eye exhibits no discharge. Left eye exhibits no discharge. No scleral icterus.   Cardiovascular: Normal rate, regular rhythm, normal heart sounds and normal pulses. Exam reveals no gallop and no friction rub.   No murmur heard.  Pulmonary/Chest: Effort normal and breath sounds normal. No respiratory distress. He has no wheezes. He has no rhonchi. He has no rales.   Abdominal: Normal appearance.   Musculoskeletal:      Right lower leg: No edema.      Left lower leg: No edema.   Lymphadenopathy:     He has no cervical adenopathy.   Neurological: He is alert.   Skin: Skin is warm and dry. Capillary refill takes less than 2 seconds. He is not diaphoretic.   Psychiatric: His behavior is normal. Mood, judgment and thought content normal.   Vitals reviewed.     Assessment/Plan     Dejan was seen today for dizziness.    Diagnoses and all orders for this visit:  -New patient and new problem to me    Dizziness  -     Comprehensive metabolic panel; Future  -     CBC auto differential; Future  -     TSH; Future    Constipation, unspecified constipation type  -     X-Ray Abdomen Flat And Erect; Future    Ella Campos MD  Family Medicine  [facility]

## 2020-12-10 ENCOUNTER — HOSPITAL ENCOUNTER (EMERGENCY)
Facility: HOSPITAL | Age: 25
Discharge: HOME | End: 2020-12-10
Attending: EMERGENCY MEDICINE
Payer: OTHER GOVERNMENT

## 2020-12-10 VITALS
SYSTOLIC BLOOD PRESSURE: 149 MMHG | OXYGEN SATURATION: 98 % | RESPIRATION RATE: 20 BRPM | HEART RATE: 98 BPM | BODY MASS INDEX: 29 KG/M2 | DIASTOLIC BLOOD PRESSURE: 92 MMHG | TEMPERATURE: 98 F | WEIGHT: 226 LBS | HEIGHT: 74 IN

## 2020-12-10 DIAGNOSIS — U07.1 COVID-19 VIRUS INFECTION: Primary | ICD-10-CM

## 2020-12-10 DIAGNOSIS — U07.1 COVID-19 VIRUS DETECTED: ICD-10-CM

## 2020-12-10 LAB — SARS-COV-2 RDRP RESP QL NAA+PROBE: POSITIVE

## 2020-12-10 PROCEDURE — 99282 EMERGENCY DEPT VISIT SF MDM: CPT

## 2020-12-10 PROCEDURE — U0002 COVID-19 LAB TEST NON-CDC: HCPCS

## 2020-12-10 NOTE — DISCHARGE INSTRUCTIONS
Download the [employer] jann to access your health records & test results.  Please remember that you had a visit to the emergency room today and this does not substitute as primary care services for ongoing management because emergency services is a snap shot in time.  Should you have any worsening condition that requires emergency services do not hesitate to return to the ER.

## 2020-12-10 NOTE — ED PROVIDER NOTES
Encounter Date: 12/10/2020       History     Chief Complaint   Patient presents with    COVID-19 Concerns     Wants COVID test, cough.    Cough     25-year-old male presents to ER for concerns of mild nonproductive cough that started this morning upon awakening and is requesting COVID-19 test; denies exacerbating or relieving factors, no prescription OTC medications have been initiated symptoms are described as mild currently    Denies:  fever, headache, dizziness, syncope, vision changes, neck pain, painful/difficult swallowing, chest pain, shortness breath, abdominal pain, nausea/vomiting/diarrhea, hematuria/dysuria    No previous evaluation has been performed nor has PCP been contacted for today's concerns    Past medical/surgical history, allergies & current medications reviewed with patient    Known SARS-CoV2 exposure:  No  Room:  Car    The history is provided by the patient. No  was used.     Review of patient's allergies indicates:  No Known Allergies  Past Medical History:   Diagnosis Date    No pertinent past medical history 06/24/2020     Past Surgical History:   Procedure Laterality Date    no surgical history   06/24/2020     Family History   Problem Relation Age of Onset    Emphysema Mother     Crohn's disease Mother     COPD Mother     Epilepsy Mother     No Known Problems Father      Social History     Tobacco Use    Smoking status: Current Every Day Smoker     Packs/day: 1.50     Years: 7.00     Pack years: 10.50    Smokeless tobacco: Never Used   Substance Use Topics    Alcohol use: Yes     Comment: occassionaly     Drug use: Never     Review of Systems   Constitutional: Negative for fever.   HENT: Negative for sore throat.    Respiratory: Positive for cough. Negative for shortness of breath.    Cardiovascular: Negative for chest pain.   Gastrointestinal: Negative for abdominal pain and nausea.   Genitourinary: Negative for dysuria.   Musculoskeletal: Negative for  back pain.   Skin: Negative for rash.   Neurological: Negative for weakness and headaches.   Hematological: Does not bruise/bleed easily.   All other systems reviewed and are negative.      Physical Exam     Initial Vitals [12/10/20 1401]   BP Pulse Resp Temp SpO2   (!) 149/92 98 20 98.1 °F (36.7 °C) 98 %      MAP       --         Physical Exam    Nursing note and vitals reviewed.  Constitutional: He appears well-developed. He does not appear ill. No distress.   AF, VSS   HENT:   Head: Normocephalic and atraumatic.   Right Ear: External ear normal.   Left Ear: External ear normal.   Nose: Nose normal.   Eyes: Lids are normal.   Neck: Neck supple.   Cardiovascular: Normal rate.   Pulmonary/Chest: Effort normal and breath sounds normal. No respiratory distress.   Abdominal: He exhibits no distension.   Neurological: He is alert.   Skin: No rash noted.   Psychiatric: He has a normal mood and affect.         ED Course   Procedures  Labs Reviewed   SARS-COV-2 RNA AMPLIFICATION, QUAL - Abnormal; Notable for the following components:       Result Value    SARS-CoV-2 RNA, Amplification, Qual Positive (*)     All other components within normal limits          Imaging Results    None          Medical Decision Making:   ED Management:  Lab results reviewed:  COVID-19+    Findings, diagnosis & plan of care discussed with patient:  COVID-19 viral infection; care instructions given -- instructed to follow-up with PCP for any further concerns    All questions answered, strict return precautions given, patient agrees with plan of care & verbalizes understanding to all instructions, pleasant visit -- vital signs are stable & patient is in no distress at discharge    Disclaimer:  This note was prepared with [employer] voice recognition transcription software. Garbled syntax, mangled pronouns, and other bizarre constructions may be attributed to that software system.  Should there be any questions do not hesitate to  contact me for clarification.\                               Clinical Impression:     ICD-10-CM ICD-9-CM   1. COVID-19 virus infection  U07.1 079.89                          ED Disposition Condition    Discharge Stable        ED Prescriptions     None        Follow-up Information     Follow up With Specialties Details Why Contact Info    Ella Campos MD Family Medicine Go to  For any further concerns [address] [facility] [address]  [phone]                                         Lance Infante NP  12/10/20 7488

## 2020-12-10 NOTE — Clinical Note
"Dejan Hill" Javier was seen and treated in our emergency department on 12/10/2020.  He may return to work on 12/21/2020.       If you have any questions or concerns, please don't hesitate to call.      Lance Infante NP"

## 2022-01-27 DIAGNOSIS — Z11.59 NEED FOR HEPATITIS C SCREENING TEST: ICD-10-CM

## 2023-01-18 ENCOUNTER — HOSPITAL ENCOUNTER (EMERGENCY)
Facility: HOSPITAL | Age: 28
Discharge: HOME | End: 2023-01-18
Attending: EMERGENCY MEDICINE

## 2023-01-18 VITALS
DIASTOLIC BLOOD PRESSURE: 68 MMHG | RESPIRATION RATE: 18 BRPM | WEIGHT: 235 LBS | OXYGEN SATURATION: 98 % | HEART RATE: 88 BPM | BODY MASS INDEX: 30.16 KG/M2 | SYSTOLIC BLOOD PRESSURE: 136 MMHG | HEIGHT: 74 IN | TEMPERATURE: 99 F

## 2023-01-18 DIAGNOSIS — R20.0 BILATERAL NUMBNESS AND TINGLING OF ARMS AND LEGS: ICD-10-CM

## 2023-01-18 DIAGNOSIS — F41.9 ANXIETY: Primary | ICD-10-CM

## 2023-01-18 DIAGNOSIS — R20.2 BILATERAL NUMBNESS AND TINGLING OF ARMS AND LEGS: ICD-10-CM

## 2023-01-18 PROCEDURE — 72040 XR CERVICAL SPINE AP LATERAL: ICD-10-PCS | Mod: 26,,, | Performed by: RADIOLOGY

## 2023-01-18 PROCEDURE — 72040 X-RAY EXAM NECK SPINE 2-3 VW: CPT | Mod: 26,,, | Performed by: RADIOLOGY

## 2023-01-18 PROCEDURE — 99283 EMERGENCY DEPT VISIT LOW MDM: CPT | Mod: 25

## 2023-01-18 PROCEDURE — 72040 X-RAY EXAM NECK SPINE 2-3 VW: CPT | Mod: TC

## 2023-01-18 RX ORDER — HYDROXYZINE PAMOATE 25 MG/1
25 CAPSULE ORAL EVERY 8 HOURS PRN
Qty: 30 CAPSULE | Refills: 0 | Status: SHIPPED | OUTPATIENT
Start: 2023-01-18

## 2023-01-18 NOTE — ED PROVIDER NOTES
Encounter Date: 1/18/2023       History     Chief Complaint   Patient presents with    Left arm tingling x 3 months      Patient reports that sometimes he can feel his heart beating when he sleeps at night . His left arm feels like it is asleep some days ,causing anxiety.      Patient is a 27-year-old male presents emergency room with left arm tingling, worsening anxiety.  Patient states this has been going on for approximately 3 years, has progressively gotten worse over the past 3 months.  Patient states over the past few weeks he is noticed that he can hear his heartbeat tonight when he lays down.  Patient states that increases his anxiety.  Patient is not taking any meds on daily basis.  Patient has not followed up with his primary care provider by this complaints.  He denies having any chest pain, shortness of breath, palpitations, nausea, vomiting, diarrhea.  He denies having any left arm numbness or tingling at this time.  Patient states he has been having headaches that radiate from his neck up.  He denies any traumatic event.  He denies anything on a daily basis specifically causing left arm numbness and tingling that he can relate to.  Previous primary care provider office visits labs reviewed, and within normal limits except for vitamin-D level was low when checked in 2020.    Review of patient's allergies indicates:  No Known Allergies  Past Medical History:   Diagnosis Date    No pertinent past medical history 06/24/2020     Past Surgical History:   Procedure Laterality Date    no surgical history   06/24/2020     Family History   Problem Relation Age of Onset    Emphysema Mother     Crohn's disease Mother     COPD Mother     Epilepsy Mother     No Known Problems Father      Social History     Tobacco Use    Smoking status: Every Day     Packs/day: 1.50     Years: 7.00     Pack years: 10.50     Types: Cigarettes    Smokeless tobacco: Never   Substance Use Topics    Alcohol use: Yes     Comment:  occassionaly     Drug use: Never     Review of Systems   Constitutional: Negative.    HENT: Negative.     Eyes: Negative.    Respiratory: Negative.     Cardiovascular: Negative.    Gastrointestinal: Negative.    Endocrine: Negative.    Genitourinary: Negative.    Musculoskeletal: Negative.    Skin: Negative.    Allergic/Immunologic: Negative for food allergies.   Neurological:  Positive for numbness (Periodic numbness tingling to the left arm). Negative for weakness.   Hematological: Negative.    Psychiatric/Behavioral: Negative.     All other systems reviewed and are negative.    Physical Exam     Initial Vitals [01/18/23 0928]   BP Pulse Resp Temp SpO2   136/68 88 18 98.7 °F (37.1 °C) 98 %      MAP       --         Physical Exam    Nursing note and vitals reviewed.  Constitutional: He appears well-developed and well-nourished. He is not diaphoretic. No distress.   HENT:   Head: Normocephalic and atraumatic.   Mouth/Throat: Oropharynx is clear and moist.   Eyes: Conjunctivae and EOM are normal. Pupils are equal, round, and reactive to light.   Neck: Neck supple. No thyromegaly present. No tracheal deviation present.   Normal range of motion.   Full passive range of motion without pain.     Cardiovascular:  Normal rate, regular rhythm, normal heart sounds and intact distal pulses.           No murmur heard.  Pulmonary/Chest: Breath sounds normal. No respiratory distress. He has no wheezes. He has no rhonchi. He has no rales. He exhibits no tenderness.   Musculoskeletal:         General: No tenderness or edema. Normal range of motion.      Left shoulder: Normal.      Cervical back: Full passive range of motion without pain, normal range of motion and neck supple. No rigidity. No spinous process tenderness or muscular tenderness.     Lymphadenopathy:     He has no cervical adenopathy.   Neurological: He is alert and oriented to person, place, and time. GCS score is 15. GCS eye subscore is 4. GCS verbal subscore is 5.  GCS motor subscore is 6.   Skin: Skin is warm and dry. Capillary refill takes 2 to 3 seconds.   Psychiatric: He has a normal mood and affect.       ED Course   Procedures  Labs Reviewed - No data to display       Imaging Results              X-Ray Cervical Spine AP And Lateral (Final result)  Result time 01/18/23 11:17:36      Final result by Margarette Cherry MD (01/18/23 11:17:36)                   Impression:      Normal cervical spine with limited visualization of C7.      Electronically signed by: Amy Jo Ann  Date:    01/18/2023  Time:    11:17               Narrative:    EXAMINATION:  XR CERVICAL SPINE AP LATERAL    CLINICAL HISTORY:  left arm pain; left arm tingling x3 months    TECHNIQUE:  AP, lateral and open mouth views of the cervical spine were performed.    COMPARISON:  None.    FINDINGS:  There is normal alignment of C1 through C6.  C7 is largely obscured by the patient's shoulders.  The odontoid process is intact.  There is no fracture or prevertebral soft tissue swelling.  No significant degenerative changes are present.                                    X-Rays:   Independently Interpreted Readings:   Other Readings:  C-spine x-ray    There is no obvious deformity, fractures, identified on x-ray.  Agree with radiologist's findings, see below.      FINDINGS:  There is normal alignment of C1 through C6.  C7 is largely obscured by the patient's shoulders.  The odontoid process is intact.  There is no fracture or prevertebral soft tissue swelling.  No significant degenerative changes are present.  Medications - No data to display  Medical Decision Making:   Initial Assessment:   Patient seen examined emergency room.  Appears to be in no acute distress this time.  Vital signs are stable.  Exam is benign.  Exam as noted above.  Differential Diagnosis:   Anxiety, cervical disc disease, bipolar, MI, trauma,  Clinical Tests:   Radiological Study: Ordered and Reviewed  ED Management:  After patient  was seen examined emergency room, do not believe the patient having any kind of heart issues at this time.  Will x-ray cervical spine to be sure there is no obvious reasons to have the arm numbness and tingling.  Patient states he does have a history of anxiety, will prescribe the patient Vistaril to take as needed for anxiety, encouraged him to take it at night specifically since his symptoms seem to be worsened night.  Encouraged patient follow-up with his primary care provider for daily or longer acting medications.    X-ray was reviewed as normal.  Discussed findings with the patient.  Will treat patient with Vistaril, encouraged patient to follow up with his primary care provider for further directions on anxiety treatment, or further testings if needed.                        Clinical Impression:   Final diagnoses:  [F41.9] Anxiety (Primary)  [R20.0, R20.2] Bilateral numbness and tingling of arms and legs        ED Disposition Condition    Discharge Stable          ED Prescriptions       Medication Sig Dispense Start Date End Date Auth. Provider    hydrOXYzine pamoate (VISTARIL) 25 MG Cap Take 1 capsule (25 mg total) by mouth every 8 (eight) hours as needed (Anxiety). 30 capsule 1/18/2023 -- Robert Thornton NP          Follow-up Information       Follow up With Specialties Details Why Contact Info        Follow-up with primary care provider in 3-5 days for further treatment and evaluation of underlying anxiety.             Robert Thornton NP  01/18/23 7332

## 2023-01-18 NOTE — DISCHARGE INSTRUCTIONS
Take medications as needed for anxiety.    Follow-up primary care provider next 5-7 days for further evaluation and possible daily treatment of anxieties.    If you continue to have numbness or tingling in the arms, further testing may be warranted in his far as CT or MRI of her neck.    Return emergency room if symptoms worsen, continue, or you develop any new other worrisome symptom.

## 2023-01-18 NOTE — Clinical Note
"Dejan Hill" Javier was seen and treated in our emergency department on 1/18/2023.  He may return to work on 01/19/2023.       If you have any questions or concerns, please don't hesitate to call.      Liam Knight MD"

## 2025-07-10 ENCOUNTER — HOSPITAL ENCOUNTER (EMERGENCY)
Facility: HOSPITAL | Age: 30
Discharge: HOME | End: 2025-07-10
Attending: INTERNAL MEDICINE

## 2025-07-10 VITALS
RESPIRATION RATE: 18 BRPM | SYSTOLIC BLOOD PRESSURE: 144 MMHG | DIASTOLIC BLOOD PRESSURE: 91 MMHG | WEIGHT: 216.63 LBS | BODY MASS INDEX: 27.8 KG/M2 | OXYGEN SATURATION: 100 % | HEIGHT: 74 IN | TEMPERATURE: 98 F | HEART RATE: 69 BPM

## 2025-07-10 DIAGNOSIS — R07.9 CHEST PAIN: ICD-10-CM

## 2025-07-10 LAB
ABSOLUTE EOSINOPHIL (OHS): 0.58 K/UL
ABSOLUTE MONOCYTE (OHS): 0.56 K/UL (ref 0.3–1)
ABSOLUTE NEUTROPHIL COUNT (OHS): 7.23 K/UL (ref 1.8–7.7)
ALBUMIN SERPL BCP-MCNC: 4.1 G/DL (ref 3.5–5.2)
ALP SERPL-CCNC: 66 UNIT/L (ref 40–150)
ALT SERPL W/O P-5'-P-CCNC: 36 UNIT/L (ref 10–44)
ANION GAP (OHS): 8 MMOL/L (ref 8–16)
AST SERPL-CCNC: 17 UNIT/L (ref 11–45)
BASOPHILS # BLD AUTO: 0.05 K/UL
BASOPHILS NFR BLD AUTO: 0.5 %
BILIRUB SERPL-MCNC: 0.6 MG/DL (ref 0.1–1)
BNP SERPL-MCNC: 11 PG/ML (ref 0–99)
BUN SERPL-MCNC: 10 MG/DL (ref 6–20)
CALCIUM SERPL-MCNC: 9.2 MG/DL (ref 8.7–10.5)
CHLORIDE SERPL-SCNC: 107 MMOL/L (ref 95–110)
CO2 SERPL-SCNC: 21 MMOL/L (ref 23–29)
CREAT SERPL-MCNC: 0.8 MG/DL (ref 0.5–1.4)
ERYTHROCYTE [DISTWIDTH] IN BLOOD BY AUTOMATED COUNT: 11.9 % (ref 11.5–14.5)
GFR SERPLBLD CREATININE-BSD FMLA CKD-EPI: >60 ML/MIN/1.73/M2
GLUCOSE SERPL-MCNC: 106 MG/DL (ref 70–110)
HCT VFR BLD AUTO: 46.1 % (ref 40–54)
HCV AB SERPL QL IA: NEGATIVE
HGB BLD-MCNC: 16.3 GM/DL (ref 14–18)
HIV 1+2 AB+HIV1 P24 AG SERPL QL IA: NEGATIVE
HOLD SPECIMEN: NORMAL
HOLD SPECIMEN: NORMAL
IMM GRANULOCYTES # BLD AUTO: 0.04 K/UL (ref 0–0.04)
IMM GRANULOCYTES NFR BLD AUTO: 0.4 % (ref 0–0.5)
LYMPHOCYTES # BLD AUTO: 2.36 K/UL (ref 1–4.8)
MCH RBC QN AUTO: 31.3 PG (ref 27–31)
MCHC RBC AUTO-ENTMCNC: 35.4 G/DL (ref 32–36)
MCV RBC AUTO: 89 FL (ref 82–98)
NUCLEATED RBC (/100WBC) (OHS): 0 /100 WBC
PLATELET # BLD AUTO: 235 K/UL (ref 150–450)
PMV BLD AUTO: 10.6 FL (ref 9.2–12.9)
POTASSIUM SERPL-SCNC: 4 MMOL/L (ref 3.5–5.1)
PROT SERPL-MCNC: 7.8 GM/DL (ref 6–8.4)
RBC # BLD AUTO: 5.21 M/UL (ref 4.6–6.2)
RELATIVE EOSINOPHIL (OHS): 5.4 %
RELATIVE LYMPHOCYTE (OHS): 21.8 % (ref 18–48)
RELATIVE MONOCYTE (OHS): 5.2 % (ref 4–15)
RELATIVE NEUTROPHIL (OHS): 66.7 % (ref 38–73)
SODIUM SERPL-SCNC: 136 MMOL/L (ref 136–145)
TROPONIN I SERPL DL<=0.01 NG/ML-MCNC: <0.006 NG/ML
WBC # BLD AUTO: 10.82 K/UL (ref 3.9–12.7)

## 2025-07-10 PROCEDURE — 83880 ASSAY OF NATRIURETIC PEPTIDE: CPT | Performed by: NURSE PRACTITIONER

## 2025-07-10 PROCEDURE — 85025 COMPLETE CBC W/AUTO DIFF WBC: CPT | Performed by: NURSE PRACTITIONER

## 2025-07-10 PROCEDURE — 84295 ASSAY OF SERUM SODIUM: CPT | Performed by: NURSE PRACTITIONER

## 2025-07-10 PROCEDURE — 87389 HIV-1 AG W/HIV-1&-2 AB AG IA: CPT | Performed by: FAMILY MEDICINE

## 2025-07-10 PROCEDURE — 93005 ELECTROCARDIOGRAM TRACING: CPT

## 2025-07-10 PROCEDURE — 86803 HEPATITIS C AB TEST: CPT | Performed by: FAMILY MEDICINE

## 2025-07-10 PROCEDURE — 93010 ELECTROCARDIOGRAM REPORT: CPT | Mod: ,,, | Performed by: INTERNAL MEDICINE

## 2025-07-10 PROCEDURE — 84484 ASSAY OF TROPONIN QUANT: CPT | Performed by: NURSE PRACTITIONER

## 2025-07-10 PROCEDURE — 99285 EMERGENCY DEPT VISIT HI MDM: CPT | Mod: 25

## 2025-07-10 RX ORDER — HYDROXYZINE HYDROCHLORIDE 25 MG/1
25 TABLET, FILM COATED ORAL 3 TIMES DAILY PRN
Qty: 30 TABLET | Refills: 0 | Status: SHIPPED | OUTPATIENT
Start: 2025-07-10

## 2025-07-10 NOTE — DISCHARGE INSTRUCTIONS
Take medication as prescribed.  Follow up with your primary care provider for further evaluation and management and referral to Cardiology as needed.  Return to ER for new or worsening symptoms.

## 2025-07-10 NOTE — ED PROVIDER NOTES
Encounter Date: 7/10/2025       History     Chief Complaint   Patient presents with    Chest Pain     Midsternal chest pain that radiates to both ribs and middle of back. Pt states pain started 1 hour pta.      29-year-old male who presents to ER for evaluation of chest pain described as tightness and pressure in nature.  Reports symptoms have been intermittent for 1 hour.  Denies cough, fever, or shortness of breath.  Patient reports some relief with hydroxyzine and ibuprofen prior to arrival.  Patient reports history of anxiety.        Review of patient's allergies indicates:  No Known Allergies  Past Medical History:   Diagnosis Date    No pertinent past medical history 06/24/2020     Past Surgical History:   Procedure Laterality Date    no surgical history   06/24/2020     Family History   Problem Relation Name Age of Onset    Emphysema Mother      Crohn's disease Mother      COPD Mother      Epilepsy Mother      No Known Problems Father       Social History[1]  Review of Systems   Constitutional:  Negative for fever.   HENT:  Negative for sore throat.    Respiratory:  Negative for cough and shortness of breath.    Cardiovascular:  Positive for chest pain. Negative for palpitations.   Gastrointestinal:  Negative for abdominal pain and nausea.   Genitourinary:  Negative for dysuria.   Musculoskeletal:  Negative for back pain.   Skin:  Negative for rash.   Neurological:  Negative for dizziness and weakness.   Hematological:  Does not bruise/bleed easily.   Psychiatric/Behavioral:  Negative for suicidal ideas.        Physical Exam     Initial Vitals [07/10/25 0823]   BP Pulse Resp Temp SpO2   (!) 137/93 83 14 97.7 °F (36.5 °C) 97 %      MAP       --         Physical Exam    Constitutional: He appears well-developed and well-nourished. No distress.   HENT:   Head: Normocephalic and atraumatic.   Eyes: Conjunctivae and EOM are normal. Pupils are equal, round, and reactive to light.   Neck: Neck supple.   Normal range  of motion.  Cardiovascular:  Normal heart sounds.           Pulmonary/Chest: Breath sounds normal.   Abdominal: Abdomen is soft. He exhibits no distension.   Musculoskeletal:         General: Normal range of motion.      Cervical back: Normal range of motion and neck supple.     Neurological: He is alert and oriented to person, place, and time. He has normal strength. No cranial nerve deficit or sensory deficit. GCS score is 15. GCS eye subscore is 4. GCS verbal subscore is 5. GCS motor subscore is 6.   Skin: Skin is warm and dry. Capillary refill takes less than 2 seconds. No rash noted.   Psychiatric: He has a normal mood and affect.         ED Course   Procedures  Labs Reviewed   COMPREHENSIVE METABOLIC PANEL - Abnormal       Result Value    Sodium 136      Potassium 4.0      Chloride 107      CO2 21 (*)     Glucose 106      BUN 10      Creatinine 0.8      Calcium 9.2      Protein Total 7.8      Albumin 4.1      Bilirubin Total 0.6      ALP 66      AST 17      ALT 36      Anion Gap 8      eGFR >60     CBC WITH DIFFERENTIAL - Abnormal    WBC 10.82      RBC 5.21      HGB 16.3      HCT 46.1      MCV 89      MCH 31.3 (*)     MCHC 35.4      RDW 11.9      Platelet Count 235      MPV 10.6      Nucleated RBC 0      Neut % 66.7      Lymph % 21.8      Mono % 5.2      Eos % 5.4      Basophil % 0.5      Imm Grans % 0.4      Neut # 7.23      Lymph # 2.36      Mono # 0.56      Eos # 0.58 (*)     Baso # 0.05      Imm Grans # 0.04     HEPATITIS C ANTIBODY - Normal    Hep C Ab Interp Negative     HIV 1 / 2 ANTIBODY - Normal    HIV 1/2 Ag/Ab Negative     TROPONIN I - Normal    Troponin-I <0.006     B-TYPE NATRIURETIC PEPTIDE - Normal    BNP 11     CBC W/ AUTO DIFFERENTIAL    Narrative:     The following orders were created for panel order CBC auto differential.  Procedure                               Abnormality         Status                     ---------                               -----------         ------                      CBC with Differential[[id number]]       Abnormal            Final result                 Please view results for these tests on the individual orders.   EXTRA TUBES    Narrative:     The following orders were created for panel order EXTRA TUBES.  Procedure                               Abnormality         Status                     ---------                               -----------         ------                     Light Blue Top Hold[[id number]]                             Final result               Light Green Top Hold[[id number]]                            Final result                 Please view results for these tests on the individual orders.   LIGHT BLUE TOP HOLD    Extra Tube Hold for add-ons.     LIGHT GREEN TOP HOLD    Extra Tube Hold for add-ons.     HEP C VIRUS HOLD SPECIMEN     EKG Readings: (Independently Interpreted)   Rhythm: Normal Sinus Rhythm. Heart Rate: 70.     ECG Results              EKG 12-lead (In process)        Collection Time Result Time QRS Duration [facility] QTC Calculation    07/10/25 08:23:54 07/10/25 09:20:22 96 399                     In process by Interface, Lab In [facility] (07/10/25 09:20:27)                   Narrative:    Test Reason : R07.9,    Vent. Rate :  70 BPM     Atrial Rate :  70 BPM     P-R Int : 152 ms          QRS Dur :  96 ms      QT Int : 370 ms       P-R-T Axes :  37 -25  23 degrees    QTcB Int : 399 ms    Normal sinus rhythm with sinus arrhythmia  Minimal voltage criteria for LVH, may be normal variant ( R in aVL )  Borderline Abnormal ECG  No previous ECGs available    Referred By: AAAREFERRAL SELF           Confirmed By:                                   Imaging Results              X-Ray Chest AP Portable (Final result)  Result time 07/10/25 09:04:52      Final result by Sabas Batista MD (07/10/25 09:04:52)                   Impression:      1.  Negative for acute process involving the chest.    2.  Incidental findings as noted above.      Electronically signed  by: Sabas Batista MD  Date:    07/10/2025  Time:    09:04               Narrative:    EXAMINATION:  XR CHEST AP PORTABLE    CLINICAL HISTORY:  Chest Pain;    COMPARISON:  No comparison studies are available.    FINDINGS:  EKG leads overlie the chest.  The lungs are clear. The cardiac silhouette size is normal. The trachea is midline and the mediastinal width is normal. Negative for focal infiltrate, effusion or pneumothorax. Pulmonary vasculature is normal. Negative for osseous abnormalities. Marginal spondylosis.                                       Medications - No data to display  Medical Decision Making  Amount and/or Complexity of Data Reviewed  Labs: ordered.  Radiology: ordered.    Risk  Prescription drug management.                  10:00 AM  Patient presents to ER for evaluation of chest pain.  Patient comfortable.  Afebrile.  Nontoxic appearing.  Vital signs stable.  Cardiac workup is negative.  All results discussed with patient.  Patient declined 2nd troponin.  I will discharge patient home to follow up with PCP and Cardiology.  I suspect symptoms to be associated with anxiety.  Discharge home with hydroxyzine.  Advised to return to ER for new or worsening symptoms.  Patient verbalized understanding and is in agreement with this plan.  Stable for discharge.  Differential diagnosis include cardiac arrhythmia, pneumonia, pericarditis, panic attack, GERD, gastritis, PE                Clinical Impression:  Final diagnoses:  [R07.9] Chest pain          ED Disposition Condition    Discharge Stable          ED Prescriptions       Medication Sig Dispense Start Date End Date Auth. Provider    hydrOXYzine HCL (ATARAX) 25 MG tablet Take 1 tablet (25 mg total) by mouth 3 (three) times daily as needed for Anxiety. 30 tablet 7/10/2025 -- Estrellita Pena NP          Follow-up Information    None                [1]   Social History  Tobacco Use    Smoking status: Every Day     Current packs/day: 1.50     Average  packs/day: 1.5 packs/day for 7.0 years (10.5 ttl pk-yrs)     Types: Cigarettes    Smokeless tobacco: Never   Substance Use Topics    Alcohol use: Yes     Comment: occassionaly     Drug use: Never        Estrellita Pena NP  07/11/25 0811

## 2025-07-13 LAB
HOLD SPECIMEN: NORMAL
OHS QRS DURATION: 96 MS
OHS QTC CALCULATION: 399 MS